# Patient Record
Sex: FEMALE | Race: BLACK OR AFRICAN AMERICAN | NOT HISPANIC OR LATINO | URBAN - NONMETROPOLITAN AREA
[De-identification: names, ages, dates, MRNs, and addresses within clinical notes are randomized per-mention and may not be internally consistent; named-entity substitution may affect disease eponyms.]

---

## 2020-01-03 NOTE — PATIENT DISCUSSION
[facility] 36 point screen: I have reviewed the visual fields both taped and untaped on this patient which demonstrate significant obstruction of the patient's peripheral visual field on both eyes.

## 2020-02-12 NOTE — PATIENT DISCUSSION
Also, please do not hesitate to call us if you have any concerns not addressed by this information. Please call [phone] and we will do everything we can to help you during this period.

## 2021-11-04 ENCOUNTER — IMPORTED ENCOUNTER (OUTPATIENT)
Dept: URBAN - NONMETROPOLITAN AREA CLINIC 1 | Facility: CLINIC | Age: 46
End: 2021-11-04

## 2021-11-04 PROBLEM — H52.223: Noted: 2021-11-04

## 2021-11-04 PROBLEM — H52.4: Noted: 2021-11-04

## 2021-11-04 PROBLEM — H52.13: Noted: 2021-11-04

## 2021-11-04 PROCEDURE — S0620 ROUTINE OPHTHALMOLOGICAL EXA: HCPCS

## 2022-04-09 ASSESSMENT — VISUAL ACUITY
OS_CC: 20/20-2
OD_CC: 20/20-2
OD_SC: 20/30
OS_SC: 20/30

## 2022-04-09 ASSESSMENT — TONOMETRY
OS_IOP_MMHG: 22
OD_IOP_MMHG: 20

## 2022-11-18 ENCOUNTER — ESTABLISHED PATIENT (OUTPATIENT)
Dept: RURAL CLINIC 2 | Facility: CLINIC | Age: 47
End: 2022-11-18

## 2022-11-18 DIAGNOSIS — E11.9: ICD-10-CM

## 2022-11-18 DIAGNOSIS — H52.13: ICD-10-CM

## 2022-11-18 DIAGNOSIS — H25.813: ICD-10-CM

## 2022-11-18 DIAGNOSIS — H52.4: ICD-10-CM

## 2022-11-18 PROCEDURE — 92014 COMPRE OPH EXAM EST PT 1/>: CPT

## 2022-11-18 PROCEDURE — 92015 DETERMINE REFRACTIVE STATE: CPT

## 2022-11-18 ASSESSMENT — TONOMETRY
OD_IOP_MMHG: 15
OS_IOP_MMHG: 18

## 2022-11-18 ASSESSMENT — VISUAL ACUITY
OS_SC: 20/20
OD_SC: 20/20

## 2024-02-06 ENCOUNTER — ESTABLISHED PATIENT (OUTPATIENT)
Dept: RURAL CLINIC 2 | Facility: CLINIC | Age: 49
End: 2024-02-06

## 2024-02-06 DIAGNOSIS — H52.223: ICD-10-CM

## 2024-02-06 DIAGNOSIS — E11.9: ICD-10-CM

## 2024-02-06 DIAGNOSIS — H52.11: ICD-10-CM

## 2024-02-06 DIAGNOSIS — H52.4: ICD-10-CM

## 2024-02-06 DIAGNOSIS — H25.813: ICD-10-CM

## 2024-02-06 PROCEDURE — 92015 DETERMINE REFRACTIVE STATE: CPT

## 2024-02-06 PROCEDURE — 92014 COMPRE OPH EXAM EST PT 1/>: CPT

## 2024-02-06 ASSESSMENT — VISUAL ACUITY
OD_SC: 20/25+
OS_SC: 20/25

## 2024-02-06 ASSESSMENT — TONOMETRY
OS_IOP_MMHG: 17
OD_IOP_MMHG: 17

## 2025-02-07 ENCOUNTER — COMPREHENSIVE EXAM (OUTPATIENT)
Age: 50
End: 2025-02-07

## 2025-02-07 DIAGNOSIS — R51.9: ICD-10-CM

## 2025-02-07 DIAGNOSIS — H52.11: ICD-10-CM

## 2025-02-07 DIAGNOSIS — H47.333: ICD-10-CM

## 2025-02-07 DIAGNOSIS — H25.813: ICD-10-CM

## 2025-02-07 DIAGNOSIS — E11.9: ICD-10-CM

## 2025-02-07 DIAGNOSIS — H52.4: ICD-10-CM

## 2025-02-07 PROCEDURE — 99214 OFFICE O/P EST MOD 30 MIN: CPT

## 2025-02-07 PROCEDURE — 92015 DETERMINE REFRACTIVE STATE: CPT

## 2025-02-07 PROCEDURE — 92133 CPTRZD OPH DX IMG PST SGM ON: CPT
